# Patient Record
Sex: MALE | Race: WHITE | NOT HISPANIC OR LATINO | URBAN - METROPOLITAN AREA
[De-identification: names, ages, dates, MRNs, and addresses within clinical notes are randomized per-mention and may not be internally consistent; named-entity substitution may affect disease eponyms.]

---

## 2017-07-12 ENCOUNTER — OUTPATIENT (OUTPATIENT)
Dept: OUTPATIENT SERVICES | Facility: HOSPITAL | Age: 67
LOS: 1 days | End: 2017-07-12
Payer: MEDICARE

## 2017-07-12 VITALS
WEIGHT: 175.05 LBS | TEMPERATURE: 98 F | SYSTOLIC BLOOD PRESSURE: 163 MMHG | DIASTOLIC BLOOD PRESSURE: 90 MMHG | OXYGEN SATURATION: 99 % | RESPIRATION RATE: 20 BRPM | HEIGHT: 66 IN | HEART RATE: 57 BPM

## 2017-07-12 DIAGNOSIS — G56.01 CARPAL TUNNEL SYNDROME, RIGHT UPPER LIMB: ICD-10-CM

## 2017-07-12 DIAGNOSIS — Z98.49 CATARACT EXTRACTION STATUS, UNSPECIFIED EYE: Chronic | ICD-10-CM

## 2017-07-12 DIAGNOSIS — Z01.818 ENCOUNTER FOR OTHER PREPROCEDURAL EXAMINATION: ICD-10-CM

## 2017-07-12 DIAGNOSIS — Z98.890 OTHER SPECIFIED POSTPROCEDURAL STATES: Chronic | ICD-10-CM

## 2017-07-12 DIAGNOSIS — G56.00 CARPAL TUNNEL SYNDROME, UNSPECIFIED UPPER LIMB: ICD-10-CM

## 2017-07-12 LAB
ANION GAP SERPL CALC-SCNC: 12 MMOL/L — SIGNIFICANT CHANGE UP (ref 5–17)
BUN SERPL-MCNC: 23 MG/DL — SIGNIFICANT CHANGE UP (ref 7–23)
CALCIUM SERPL-MCNC: 9.8 MG/DL — SIGNIFICANT CHANGE UP (ref 8.4–10.5)
CHLORIDE SERPL-SCNC: 102 MMOL/L — SIGNIFICANT CHANGE UP (ref 96–108)
CO2 SERPL-SCNC: 25 MMOL/L — SIGNIFICANT CHANGE UP (ref 22–31)
CREAT SERPL-MCNC: 1.14 MG/DL — SIGNIFICANT CHANGE UP (ref 0.5–1.3)
GLUCOSE SERPL-MCNC: 96 MG/DL — SIGNIFICANT CHANGE UP (ref 70–99)
HCT VFR BLD CALC: 42.4 % — SIGNIFICANT CHANGE UP (ref 39–50)
HGB BLD-MCNC: 14.2 G/DL — SIGNIFICANT CHANGE UP (ref 13–17)
MCHC RBC-ENTMCNC: 28.9 PG — SIGNIFICANT CHANGE UP (ref 27–34)
MCHC RBC-ENTMCNC: 33.5 GM/DL — SIGNIFICANT CHANGE UP (ref 32–36)
MCV RBC AUTO: 86.4 FL — SIGNIFICANT CHANGE UP (ref 80–100)
PLATELET # BLD AUTO: 253 K/UL — SIGNIFICANT CHANGE UP (ref 150–400)
POTASSIUM SERPL-MCNC: 4.7 MMOL/L — SIGNIFICANT CHANGE UP (ref 3.5–5.3)
POTASSIUM SERPL-SCNC: 4.7 MMOL/L — SIGNIFICANT CHANGE UP (ref 3.5–5.3)
RBC # BLD: 4.91 M/UL — SIGNIFICANT CHANGE UP (ref 4.2–5.8)
RBC # FLD: 14 % — SIGNIFICANT CHANGE UP (ref 10.3–14.5)
SODIUM SERPL-SCNC: 139 MMOL/L — SIGNIFICANT CHANGE UP (ref 135–145)
WBC # BLD: 7.49 K/UL — SIGNIFICANT CHANGE UP (ref 3.8–10.5)
WBC # FLD AUTO: 7.49 K/UL — SIGNIFICANT CHANGE UP (ref 3.8–10.5)

## 2017-07-12 PROCEDURE — 80048 BASIC METABOLIC PNL TOTAL CA: CPT

## 2017-07-12 PROCEDURE — 85027 COMPLETE CBC AUTOMATED: CPT

## 2017-07-12 PROCEDURE — G0463: CPT

## 2017-07-12 RX ORDER — LIDOCAINE HCL 20 MG/ML
0.2 VIAL (ML) INJECTION ONCE
Qty: 0 | Refills: 0 | Status: DISCONTINUED | OUTPATIENT
Start: 2017-07-26 | End: 2017-08-10

## 2017-07-12 RX ORDER — SODIUM CHLORIDE 9 MG/ML
3 INJECTION INTRAMUSCULAR; INTRAVENOUS; SUBCUTANEOUS EVERY 8 HOURS
Qty: 0 | Refills: 0 | Status: DISCONTINUED | OUTPATIENT
Start: 2017-07-26 | End: 2017-08-10

## 2017-07-12 RX ORDER — ACETAMINOPHEN 500 MG
975 TABLET ORAL ONCE
Qty: 0 | Refills: 0 | Status: COMPLETED | OUTPATIENT
Start: 2017-07-26 | End: 2017-07-26

## 2017-07-12 RX ORDER — CELECOXIB 200 MG/1
200 CAPSULE ORAL ONCE
Qty: 0 | Refills: 0 | Status: COMPLETED | OUTPATIENT
Start: 2017-07-26 | End: 2017-07-26

## 2017-07-12 NOTE — H&P PST ADULT - NSANTHOSAYNRD_GEN_A_CORE
No. CARLO screening performed.  STOP BANG Legend: 0-2 = LOW Risk; 3-4 = INTERMEDIATE Risk; 5-8 = HIGH Risk

## 2017-07-12 NOTE — H&P PST ADULT - ATTENDING COMMENTS
The patient is admitted today for a right CTR and flexor tenosynovectomy and in situ decompression of ulnar nerve at elbow.  I have reviewed the procedure in detail again today with the patient.  The numerous risks, benefits, alternatives, possible complications and expectations are reviewed.  All questions have been thoroughly answered and the patient has verbalized understanding of all of the above and gives consent to proceed

## 2017-07-12 NOTE — H&P PST ADULT - HISTORY OF PRESENT ILLNESS
68 yo male with numbness in right wrist seen by MD, diagnosed with bilateral carpal tunnel and nerve compression right elbow. Here for surgery.

## 2017-07-12 NOTE — H&P PST ADULT - PROBLEM SELECTOR PLAN 1
right carpal tunnel release, flexor tenosynovectomy and in situ decompression of ulnar nerve at elbow  continue aspirin if ok w Dr Rendon

## 2017-07-26 ENCOUNTER — OUTPATIENT (OUTPATIENT)
Dept: OUTPATIENT SERVICES | Facility: HOSPITAL | Age: 67
LOS: 1 days | End: 2017-07-26
Payer: MEDICARE

## 2017-07-26 ENCOUNTER — TRANSCRIPTION ENCOUNTER (OUTPATIENT)
Age: 67
End: 2017-07-26

## 2017-07-26 ENCOUNTER — RESULT REVIEW (OUTPATIENT)
Age: 67
End: 2017-07-26

## 2017-07-26 VITALS
TEMPERATURE: 97 F | SYSTOLIC BLOOD PRESSURE: 123 MMHG | HEART RATE: 609 BPM | OXYGEN SATURATION: 100 % | DIASTOLIC BLOOD PRESSURE: 80 MMHG | RESPIRATION RATE: 14 BRPM

## 2017-07-26 VITALS
HEART RATE: 57 BPM | RESPIRATION RATE: 20 BRPM | OXYGEN SATURATION: 99 % | DIASTOLIC BLOOD PRESSURE: 90 MMHG | HEIGHT: 66 IN | TEMPERATURE: 98 F | WEIGHT: 175.05 LBS | SYSTOLIC BLOOD PRESSURE: 174 MMHG

## 2017-07-26 DIAGNOSIS — Z98.890 OTHER SPECIFIED POSTPROCEDURAL STATES: Chronic | ICD-10-CM

## 2017-07-26 DIAGNOSIS — Z01.818 ENCOUNTER FOR OTHER PREPROCEDURAL EXAMINATION: ICD-10-CM

## 2017-07-26 DIAGNOSIS — G56.01 CARPAL TUNNEL SYNDROME, RIGHT UPPER LIMB: ICD-10-CM

## 2017-07-26 DIAGNOSIS — Z98.49 CATARACT EXTRACTION STATUS, UNSPECIFIED EYE: Chronic | ICD-10-CM

## 2017-07-26 PROCEDURE — 64718 REVISE ULNAR NERVE AT ELBOW: CPT | Mod: RT

## 2017-07-26 PROCEDURE — 25115 REMOVE WRIST/FOREARM LESION: CPT | Mod: RT

## 2017-07-26 PROCEDURE — 88304 TISSUE EXAM BY PATHOLOGIST: CPT | Mod: 26

## 2017-07-26 RX ORDER — ASPIRIN/CALCIUM CARB/MAGNESIUM 324 MG
1 TABLET ORAL
Qty: 0 | Refills: 0 | COMMUNITY

## 2017-07-26 RX ORDER — ESZOPICLONE 2 MG/1
1 TABLET, COATED ORAL
Qty: 0 | Refills: 0 | COMMUNITY

## 2017-07-26 RX ORDER — SIMVASTATIN 20 MG/1
1 TABLET, FILM COATED ORAL
Qty: 0 | Refills: 0 | COMMUNITY

## 2017-07-26 RX ORDER — SODIUM CHLORIDE 9 MG/ML
1000 INJECTION, SOLUTION INTRAVENOUS
Qty: 0 | Refills: 0 | Status: DISCONTINUED | OUTPATIENT
Start: 2017-07-26 | End: 2017-08-10

## 2017-07-26 RX ORDER — METOPROLOL TARTRATE 50 MG
1 TABLET ORAL
Qty: 0 | Refills: 0 | COMMUNITY

## 2017-07-26 RX ORDER — AMLODIPINE BESYLATE 2.5 MG/1
1 TABLET ORAL
Qty: 0 | Refills: 0 | COMMUNITY

## 2017-07-26 RX ORDER — OXYCODONE HYDROCHLORIDE 5 MG/1
5 TABLET ORAL ONCE
Qty: 0 | Refills: 0 | Status: DISCONTINUED | OUTPATIENT
Start: 2017-07-26 | End: 2017-07-26

## 2017-07-26 RX ORDER — ONDANSETRON 8 MG/1
4 TABLET, FILM COATED ORAL ONCE
Qty: 0 | Refills: 0 | Status: DISCONTINUED | OUTPATIENT
Start: 2017-07-26 | End: 2017-08-10

## 2017-07-26 RX ORDER — FAMOTIDINE 10 MG/ML
0 INJECTION INTRAVENOUS
Qty: 0 | Refills: 0 | COMMUNITY

## 2017-07-26 RX ORDER — CELECOXIB 200 MG/1
200 CAPSULE ORAL ONCE
Qty: 0 | Refills: 0 | Status: DISCONTINUED | OUTPATIENT
Start: 2017-07-26 | End: 2017-08-10

## 2017-07-26 RX ADMIN — Medication 975 MILLIGRAM(S): at 12:59

## 2017-07-26 RX ADMIN — CELECOXIB 200 MILLIGRAM(S): 200 CAPSULE ORAL at 12:59

## 2017-07-26 NOTE — PRE-ANESTHESIA EVALUATION ADULT - NSPROPOSEDPROCEDFT_GEN_ALL_CORE
right carpal tunnel release and flexor tenosynovecotmy, in situ decompression of right ulnar nerve at elbow

## 2017-07-26 NOTE — ASU DISCHARGE PLAN (ADULT/PEDIATRIC). - ITEMS TO FOLLOWUP WITH YOUR PHYSICIAN'S
Please follow up with Dr. Rendon within 1-2 weeks after discharge from the hospital. You may call [phone] to schedule an appointment.

## 2017-07-26 NOTE — BRIEF OPERATIVE NOTE - PRE-OP DX
Carpal tunnel syndrome of right wrist  07/26/2017    Active  Aravind Reyes  Cubital tunnel syndrome on right  07/26/2017    Active  Aravind Reyes

## 2017-07-26 NOTE — ASU DISCHARGE PLAN (ADULT/PEDIATRIC). - NOTIFY
Fever greater than 101/Persistent Nausea and Vomiting/Excessive Diarrhea/Inability to Tolerate Liquids or Foods/Numbness, color, or temperature change to extremity/Bleeding that does not stop

## 2017-07-26 NOTE — BRIEF OPERATIVE NOTE - PROCEDURE
Cubital tunnel release  07/26/2017    Active  [id number]  Carpal tunnel release, right  07/26/2017    Active  [id number]

## 2017-07-31 LAB — SURGICAL PATHOLOGY STUDY: SIGNIFICANT CHANGE UP

## 2019-02-14 PROBLEM — I10 ESSENTIAL (PRIMARY) HYPERTENSION: Chronic | Status: ACTIVE | Noted: 2017-07-12

## 2019-02-14 PROBLEM — E78.5 HYPERLIPIDEMIA, UNSPECIFIED: Chronic | Status: ACTIVE | Noted: 2017-07-12

## 2019-02-18 ENCOUNTER — OUTPATIENT (OUTPATIENT)
Dept: OUTPATIENT SERVICES | Facility: HOSPITAL | Age: 69
LOS: 1 days | End: 2019-02-18
Payer: MEDICARE

## 2019-02-18 ENCOUNTER — APPOINTMENT (OUTPATIENT)
Dept: MRI IMAGING | Facility: CLINIC | Age: 69
End: 2019-02-18
Payer: MEDICARE

## 2019-02-18 DIAGNOSIS — Z98.49 CATARACT EXTRACTION STATUS, UNSPECIFIED EYE: Chronic | ICD-10-CM

## 2019-02-18 DIAGNOSIS — Z00.8 ENCOUNTER FOR OTHER GENERAL EXAMINATION: ICD-10-CM

## 2019-02-18 DIAGNOSIS — Z98.890 OTHER SPECIFIED POSTPROCEDURAL STATES: Chronic | ICD-10-CM

## 2019-02-18 PROCEDURE — 72197 MRI PELVIS W/O & W/DYE: CPT

## 2019-02-18 PROCEDURE — A9585: CPT

## 2019-02-18 PROCEDURE — 72197 MRI PELVIS W/O & W/DYE: CPT | Mod: 26

## 2019-05-03 ENCOUNTER — APPOINTMENT (OUTPATIENT)
Dept: MRI IMAGING | Facility: CLINIC | Age: 69
End: 2019-05-03
Payer: MEDICARE

## 2019-05-03 ENCOUNTER — OUTPATIENT (OUTPATIENT)
Dept: OUTPATIENT SERVICES | Facility: HOSPITAL | Age: 69
LOS: 1 days | End: 2019-05-03
Payer: MEDICARE

## 2019-05-03 DIAGNOSIS — Z98.49 CATARACT EXTRACTION STATUS, UNSPECIFIED EYE: Chronic | ICD-10-CM

## 2019-05-03 DIAGNOSIS — Z98.890 OTHER SPECIFIED POSTPROCEDURAL STATES: Chronic | ICD-10-CM

## 2019-05-03 DIAGNOSIS — Z00.8 ENCOUNTER FOR OTHER GENERAL EXAMINATION: ICD-10-CM

## 2019-05-03 PROCEDURE — 73718 MRI LOWER EXTREMITY W/O DYE: CPT

## 2019-05-03 PROCEDURE — 73718 MRI LOWER EXTREMITY W/O DYE: CPT | Mod: 26,RT

## 2020-05-06 NOTE — PACU DISCHARGE NOTE - MENTAL STATUS: PATIENT PARTICIPATION
Awake Spironolactone Pregnancy And Lactation Text: This medication can cause feminization of the male fetus and should be avoided during pregnancy. The active metabolite is also found in breast milk.

## 2021-01-04 ENCOUNTER — TRANSCRIPTION ENCOUNTER (OUTPATIENT)
Age: 71
End: 2021-01-04

## 2021-01-04 ENCOUNTER — EMERGENCY (EMERGENCY)
Facility: HOSPITAL | Age: 71
LOS: 1 days | Discharge: LEFT BEFORE DISCHARGE | End: 2021-01-04
Attending: STUDENT IN AN ORGANIZED HEALTH CARE EDUCATION/TRAINING PROGRAM
Payer: MEDICARE

## 2021-01-04 VITALS
DIASTOLIC BLOOD PRESSURE: 76 MMHG | HEIGHT: 66 IN | OXYGEN SATURATION: 90 % | HEART RATE: 90 BPM | WEIGHT: 179.9 LBS | RESPIRATION RATE: 20 BRPM | SYSTOLIC BLOOD PRESSURE: 119 MMHG | TEMPERATURE: 100 F

## 2021-01-04 VITALS
OXYGEN SATURATION: 94 % | TEMPERATURE: 99 F | DIASTOLIC BLOOD PRESSURE: 89 MMHG | SYSTOLIC BLOOD PRESSURE: 127 MMHG | RESPIRATION RATE: 19 BRPM | HEART RATE: 88 BPM

## 2021-01-04 DIAGNOSIS — Z98.890 OTHER SPECIFIED POSTPROCEDURAL STATES: Chronic | ICD-10-CM

## 2021-01-04 DIAGNOSIS — Z98.49 CATARACT EXTRACTION STATUS, UNSPECIFIED EYE: Chronic | ICD-10-CM

## 2021-01-04 LAB
ALBUMIN SERPL ELPH-MCNC: 3.4 G/DL — SIGNIFICANT CHANGE UP (ref 3.3–5)
ALP SERPL-CCNC: 106 U/L — SIGNIFICANT CHANGE UP (ref 40–120)
ALT FLD-CCNC: 22 U/L — SIGNIFICANT CHANGE UP (ref 10–45)
ANION GAP SERPL CALC-SCNC: 16 MMOL/L — SIGNIFICANT CHANGE UP (ref 5–17)
AST SERPL-CCNC: 48 U/L — HIGH (ref 10–40)
BASOPHILS # BLD AUTO: 0.01 K/UL — SIGNIFICANT CHANGE UP (ref 0–0.2)
BASOPHILS NFR BLD AUTO: 0.1 % — SIGNIFICANT CHANGE UP (ref 0–2)
BILIRUB SERPL-MCNC: 0.4 MG/DL — SIGNIFICANT CHANGE UP (ref 0.2–1.2)
BUN SERPL-MCNC: 24 MG/DL — HIGH (ref 7–23)
CALCIUM SERPL-MCNC: 9 MG/DL — SIGNIFICANT CHANGE UP (ref 8.4–10.5)
CHLORIDE SERPL-SCNC: 95 MMOL/L — LOW (ref 96–108)
CO2 SERPL-SCNC: 19 MMOL/L — LOW (ref 22–31)
CREAT SERPL-MCNC: 1.35 MG/DL — HIGH (ref 0.5–1.3)
CRP SERPL-MCNC: 17.62 MG/DL — HIGH (ref 0–0.4)
D DIMER BLD IA.RAPID-MCNC: 476 NG/ML DDU — HIGH
EOSINOPHIL # BLD AUTO: 0 K/UL — SIGNIFICANT CHANGE UP (ref 0–0.5)
EOSINOPHIL NFR BLD AUTO: 0 % — SIGNIFICANT CHANGE UP (ref 0–6)
GLUCOSE SERPL-MCNC: 108 MG/DL — HIGH (ref 70–99)
HCT VFR BLD CALC: 47.3 % — SIGNIFICANT CHANGE UP (ref 39–50)
HGB BLD-MCNC: 15.4 G/DL — SIGNIFICANT CHANGE UP (ref 13–17)
IMM GRANULOCYTES NFR BLD AUTO: 2 % — HIGH (ref 0–1.5)
LYMPHOCYTES # BLD AUTO: 0.77 K/UL — LOW (ref 1–3.3)
LYMPHOCYTES # BLD AUTO: 4.9 % — LOW (ref 13–44)
MCHC RBC-ENTMCNC: 28 PG — SIGNIFICANT CHANGE UP (ref 27–34)
MCHC RBC-ENTMCNC: 32.6 GM/DL — SIGNIFICANT CHANGE UP (ref 32–36)
MCV RBC AUTO: 86 FL — SIGNIFICANT CHANGE UP (ref 80–100)
MONOCYTES # BLD AUTO: 0.73 K/UL — SIGNIFICANT CHANGE UP (ref 0–0.9)
MONOCYTES NFR BLD AUTO: 4.7 % — SIGNIFICANT CHANGE UP (ref 2–14)
NEUTROPHILS # BLD AUTO: 13.79 K/UL — HIGH (ref 1.8–7.4)
NEUTROPHILS NFR BLD AUTO: 88.3 % — HIGH (ref 43–77)
NRBC # BLD: 0 /100 WBCS — SIGNIFICANT CHANGE UP (ref 0–0)
PLATELET # BLD AUTO: 233 K/UL — SIGNIFICANT CHANGE UP (ref 150–400)
POTASSIUM SERPL-MCNC: 4.3 MMOL/L — SIGNIFICANT CHANGE UP (ref 3.5–5.3)
POTASSIUM SERPL-SCNC: 4.3 MMOL/L — SIGNIFICANT CHANGE UP (ref 3.5–5.3)
PROCALCITONIN SERPL-MCNC: 0.28 NG/ML — HIGH (ref 0.02–0.1)
PROT SERPL-MCNC: 7.5 G/DL — SIGNIFICANT CHANGE UP (ref 6–8.3)
RBC # BLD: 5.5 M/UL — SIGNIFICANT CHANGE UP (ref 4.2–5.8)
RBC # FLD: 14.8 % — HIGH (ref 10.3–14.5)
SARS-COV-2 RNA SPEC QL NAA+PROBE: DETECTED
SODIUM SERPL-SCNC: 130 MMOL/L — LOW (ref 135–145)
WBC # BLD: 15.61 K/UL — HIGH (ref 3.8–10.5)
WBC # FLD AUTO: 15.61 K/UL — HIGH (ref 3.8–10.5)

## 2021-01-04 PROCEDURE — U0005: CPT

## 2021-01-04 PROCEDURE — 85379 FIBRIN DEGRADATION QUANT: CPT

## 2021-01-04 PROCEDURE — 93005 ELECTROCARDIOGRAM TRACING: CPT

## 2021-01-04 PROCEDURE — 84145 PROCALCITONIN (PCT): CPT

## 2021-01-04 PROCEDURE — 99284 EMERGENCY DEPT VISIT MOD MDM: CPT | Mod: CS

## 2021-01-04 PROCEDURE — 85025 COMPLETE CBC W/AUTO DIFF WBC: CPT

## 2021-01-04 PROCEDURE — 80053 COMPREHEN METABOLIC PANEL: CPT

## 2021-01-04 PROCEDURE — 99283 EMERGENCY DEPT VISIT LOW MDM: CPT

## 2021-01-04 PROCEDURE — 86140 C-REACTIVE PROTEIN: CPT

## 2021-01-04 PROCEDURE — 87635 SARS-COV-2 COVID-19 AMP PRB: CPT

## 2021-01-04 PROCEDURE — 82728 ASSAY OF FERRITIN: CPT

## 2021-01-04 NOTE — ED PROVIDER NOTE - PATIENT PORTAL LINK FT
You can access the FollowMyHealth Patient Portal offered by [facility] by registering at the following website: [email or web address]. By joining [employer]’s FollowMyHealth portal, you will also be able to view your health information using other applications (apps) compatible with our system.

## 2021-01-04 NOTE — ED PROVIDER NOTE - PROGRESS NOTE DETAILS
daughter who is  for health system wants patient to go home and reports will set up all outpatient services. patient is ok with plan wants to go home. Abdullahi Alvarez PA-C Dr. Shantal Garcia: Plan was to admit patient for hypoxia, however patient does not wishes to stay and wants to leave the hospital now. The patient understands the risk of leaving without further evaluation and workup.  The patient however still declines and will follow up with their primary physician.

## 2021-01-04 NOTE — ED PROVIDER NOTE - CLINICAL SUMMARY MEDICAL DECISION MAKING FREE TEXT BOX
Dr. Shantal Garcia: 70 year old with pmhx of HTN, HLD presented to ED with 10days of COVID like symptoms. Pt with known COIVD contact, wife also has COVID like symptoms. On exam: Pt well appearing, able to speaking in full sentences. Desaturates to 91% when ambulates. Plan: Labs, cxr. admit

## 2021-01-04 NOTE — ED PROVIDER NOTE - NSFOLLOWUPINSTRUCTIONS_ED_ALL_ED_FT
1. You were seen in the ED and underwent testing for the novel coronarvirus (COVID-19). The results are not back yet and you will be contacted with the result which can take up to 7 days. You were also tested for other common viruses such as the flu and cold viruses. You will be notified if you test positive for any of these.    2. Until your test results are back, YOU MUST SELF-QUARANTINE until you are told to other otherwise by [facility] or the local [facility] department. This is extremely important to limit the spread of this virus. Please refer closely to the packet provided to you on the specifics of the process of self-quarantine.    3. If you end up testing positive for the virus, you will instructed as to when you can return to your usual activities. If you do not hear from anyone in 7 days, please call [phone] or your local health department for guidance.     4. Return to the ED for difficulty breathing.    5. You may over the counter acetaminophen (Tylenol) 650mg every 6 hours as needed for fever or pain. There is some concern in the medical community about using ibuprofen (Advil, Motrin) and other NSAIDs in people with COVID infections and until there is more research on this subject it may be best to avoid NSAIDs like ibuprofen at the moment unless you have an allergy to acetaminophen (Tylenol).  Do NOT exceed 3500mg acetaminophen in 24 hours.  Please do not take these medications if you do not have pain or fever or if you have any history of liver disease.     -------------    What is a coronavirus?  Coronaviruses are a large family of viruses that cause illnesses ranging from the common cold to more severe diseases such as Middle East Respiratory Syndrome (MERS) and Severe Acute Respiratory Syndrome (SARS).    What is Novel Coronavirus (COVID-19)?  The Centers for Disease Control and Prevention (CDC) is closely monitoring the outbreak caused by COVID-19. For the latest information about COVID-19, visit the CDC website at CDC.gov/Coronavirus    How are coronaviruses spread?  Coronaviruses can be transmitted from person to person, usually after close contact with an infected  person (for example, in a household, workplace, or healthcare setting), via droplets that become airborne after a cough or sneeze. These droplets can then infect a nearby person. Transmission can also occur by touching recently contaminated surfaces.    Is there a treatment for a COVID-19?  There is no specific treatment for disease caused by COVID-19. However, many of the symptoms can be treated based on the patient’s clinical condition. Supportive care for infected persons can be highly effective.    What are the symptoms of coronavirus infection?  It depends on the virus, but common signs include fever and/or respiratory symptoms such as cough and shortness of breath. In more severe cases, infection can cause pneumonia, severe acute respiratory syndrome, kidney failure and even death. Fortunately, most cases of COVID-19 have an illness no different than the influenza (flu), with a majority of these patients having mild symptoms and overall mortality which appears to be not much different than the flu.    What can I do to protect myself?  The best precautionary measures:  – washing your hands  – covering your cough  – disinfecting surfaces  – it is also advisable to avoid close contact with anyone showing symptoms of respiratory illness such as coughing and sneezing  – those with symptoms should wear a surgical mask when around others    What can I do to protect those around me?  If you have been identified as someone who may be infected with COVID-19, we recommend you follow the self-isolation procedures outlined on the following page to protect those around you and to limit the spread of this virus.    We recommend the below precautionary steps from now until 14 days from when you returned from your travel or date of your last known possible contact:    — Do not go to work, school or public areas. Avoid using public transportation, ridesharing or taxis.  — As much as possible, separate yourself from other people in your home. If you can, you should stay in a room and away from other people. Also, you should use a separate bathroom if available.  — Wear the supplied mask whenever you are around other people.  — If you have a non-urgent medical appointment, please reschedule for a later date. If the appointment is urgent, please call the health care provider and tell them that you are on self-isolation for possible COVID-19. This will help the health care provider’s office take steps to keep other people from getting infected or exposed. If you can reschedule routine appointments, do so.  — Wash your hands often with soap and water for at least 15 to 20 seconds or clean your hands with an alcohol-based hand  that contains 60 to 95% alcohol, covering all surfaces of your hands and rubbing them together until they feel dry. Soap and water should be used preferentially if hands are visibly dirty.  — Cover your mouth and nose with a tissue when you cough or sneeze. Throw used tissues in a lined trash can. Immediately wash your hands.  — Avoid touching your eyes, nose, and mouth with your hands.  — Avoid sharing personal household items. You should not share dishes, drinking glasses, cups, eating utensils, towels, or bedding with other people or pets in your home. After using these items, they should be washed thoroughly with soap and water.  — Clean and disinfect all “high-touch” surfaces every day. High touch surfaces include counters, tabletops, doorknobs, light switches, remote controls, bathroom fixtures, toilets, phones, keyboards, tablets, and bedside tables. Also, clean any surfaces that may have blood, stool, or body fluids on them.    ------------------------------------------  Information for patients who have received a COVID-19 test.    The COVID-19 (novel coronavirus) test  Results may take up to 7 days to become available.      If your result is positive, you will receive a phone call from one of our coronavirus specialists. While we will do our best to also call patients with a negative test result, the sheer volume of tests being performed may make this difficult to do in a timely fashion. If you haven’t heard from us within 5 days and you’d like to check on your results, you can check our Kien[facility] bonnie or call one of our coronavirus specialists at [address] [place] [address] (available 24/7)    Please DO NOT call the site where you received the test to obtain your results.    If the test is positive -   You will continue home isolation until you are completely well, you have no fever, and it has been at least 14 days since your positive test. The health department in your city or county may also contact you with additional instructions.    If your test is negative -    You will be able to stop home isolation and resume standard precautions, similar to how you would manage the common cold or flu.  If you have any questions, you can reach out to one of our coronavirus specialists at [address] [place] [address].    REMEMBER - a negative COVID test means you were negative AT THE TIME OF TESTING, and it is possible to have contracted COVID after being tested.     Please call [phone] to schedule an appointment for monoclonal antibody infusion treatment.

## 2021-01-04 NOTE — ED PROVIDER NOTE - PHYSICAL EXAMINATION
On Physical Exam:  General: well appearing, in NAD, speaking clearly in full sentences and without difficulty; cooperative with exam  HEENT: PERRL, MMM  Neck: no neck tenderness, no nuchal rigidity  Cardiac: normal s1, s2; RRR; no MGR  Lungs: CTABL  Abdomen: soft nontender/nondistended  : no bladder tenderness or distension  Skin: warm, intact, no rash  Extremities: no peripheral edema, no gross deformities  Neuro: no gross neurologic deficits

## 2021-01-04 NOTE — ED ADULT NURSE NOTE - OBJECTIVE STATEMENT
71yo M, states he is known covid positive, has been monitoring his symptoms at home including oxygen saturation and states it was in the mid 80s, along with persistent cough and headache, brought self to ED. arrives on NC, sat high 90s. pt denies SOB now or at home. c/o frontal headache with cough still. lung sounds clear to auscultation. denies chest pain, palpitations or any new onset of lower extremity edema, none noted on assessment.  pt presents alert, sitting upright in stretcher with legs crossed. relaxed body posture, good skin color. respirations not labored or shallow. iso precautions in place 2/2 covid. signage placed on door. call bell is in reach , pt is aaox4, able to make needs known.

## 2021-01-04 NOTE — ED ADULT NURSE NOTE - NSIMPLEMENTINTERV_GEN_ALL_ED
Implemented All Universal Safety Interventions:  [place] to call system. Call bell, personal items and telephone within reach. Instruct patient to call for assistance. Room bathroom lighting operational. Non-slip footwear when patient is off stretcher. Physically safe environment: no spills, clutter or unnecessary equipment. Stretcher in lowest position, wheels locked, appropriate side rails in place.

## 2021-01-04 NOTE — ED PROVIDER NOTE - OBJECTIVE STATEMENT
70 year old with pmhx of HTN, HLD, presents to the ED with 10 days of COVID like symptoms. patient reports feeling weak and cough 10 days ago and reports worsening symptoms today. patient reports home o2 sat of 88-92% ORA. patient denies any CP, SOB, exertional dyspnea, reports feeling well. daughter who is  at bedside wants patient to go home with CROWN covid FU. patient denies chest pain, Shortness of Breath, abdominal pain, Nausea/Vomiting/Diarrhea, dizziness, weakness, confusion, vision changes, urinary symptoms, syncope, falls, trauma, discharge, bleeding. patient reports currently taking abx and steroids x3 days since prescribed by PCP. patient reports fever relieved with Tylenol.

## 2021-01-04 NOTE — ED PROVIDER NOTE - CHILD ABUSE FACILITY
I have seen and examined the patient. Discussed with resident and agree with the findings and plan as documented. Counseled the patient in regards to next steps.     [facility]

## 2021-01-05 ENCOUNTER — APPOINTMENT (OUTPATIENT)
Dept: PULMONOLOGY | Facility: CLINIC | Age: 71
End: 2021-01-05
Payer: MEDICARE

## 2021-01-05 DIAGNOSIS — U07.1 COVID-19: ICD-10-CM

## 2021-01-05 LAB — FERRITIN SERPL-MCNC: 621 NG/ML — HIGH (ref 30–400)

## 2021-01-05 PROCEDURE — 99205 OFFICE O/P NEW HI 60 MIN: CPT | Mod: CS,95

## 2021-01-06 ENCOUNTER — APPOINTMENT (OUTPATIENT)
Dept: PULMONOLOGY | Facility: CLINIC | Age: 71
End: 2021-01-06

## 2021-01-06 NOTE — ED POST DISCHARGE NOTE - RESULT SUMMARY
Pt was seen in [facility] ED and dishcarged with COVID. elevated ferritin level clinically correlates with COVID dx. per inpatient notes, team aware of level. appropriate care being given. no need for callback at this time - Russell

## 2021-01-06 NOTE — HISTORY OF PRESENT ILLNESS
[Home] : at home, [unfilled] , at the time of the visit. [Medical Office: ([facility])___] : at the medical office located in  [Spouse] : spouse [Verbal consent obtained from patient] : the patient, [unfilled] [FreeTextEntry1] : video f/u COVID -- Wednesday January 6th\par \par Feeling a bit better this morning\par \par on the 3lpm of the machine he already had, O2 saturations this morning 92%.\par \par Unclear happened with the oxygen last night.  He received a phone call at 5:30 PM that it would be delivered, they say that it was not.  They called the company who reported it was delivered at 11:30 PM, but they do not see it outside.  We are looking into this.  Fortunately he already has some form of oxygen\par \par Started the dexamethasone and aspirin last night.\par \par Febrile today.  Overall feeling better.  Taking p.o.  Does still have drenching night sweats, as we see commonly.\par \par On video examination, he appears in no distress.  Speaking clearly and in full sentences.  Saturating 92% on the pulsed flow 3 L\par \par Awaiting labs to be done today.\par \par We will follow closely.  Again reiterated if cannot maintain sat above 90% with 3 L/min, to let us know when he will need to go to the hospital\par

## 2021-01-06 NOTE — HISTORY OF PRESENT ILLNESS
[Home] : at home, [unfilled] , at the time of the visit. [Medical Office: ([facility])___] : at the medical office located in  [Spouse] : spouse [Verbal consent obtained from patient] : the patient, [unfilled] [FreeTextEntry1] : video f/u COVID -- Wednesday January 6th\par \par Feeling a bit better this morning\par \par on the 3lpm of the machine he already had, O2 saturations this morning 92%.\par \par Unclear happened with the oxygen last night.  He received a phone call at 5:30 PM that it would be delivered, they say that it was not.  They called the company who reported it was delivered at 11:30 PM, but they do not see it outside.  We are looking into this.  Fortunately he already has some form of oxygen\par \par Started the dexamethasone and aspirin last night.\par \par Febrile today.  Overall feeling better.  Taking p.o.  Does still have drenching night sweats, as we see commonly.\par \par On video examination, he appears in no distress.  Speaking clearly and in full sentences.  Saturating 92% on the pulsed flow 3 L\par \par Awaiting labs to be done today.\par \par We will follow closely.  Again reiterated if cannot maintain sat above 90% with 3 L/min, to let us know when he will need to go to the hospital\par

## 2021-01-07 ENCOUNTER — APPOINTMENT (OUTPATIENT)
Dept: INTERNAL MEDICINE | Facility: CLINIC | Age: 71
End: 2021-01-07

## 2021-01-07 ENCOUNTER — APPOINTMENT (OUTPATIENT)
Dept: PULMONOLOGY | Facility: CLINIC | Age: 71
End: 2021-01-07
Payer: MEDICARE

## 2021-01-07 LAB
ALBUMIN SERPL ELPH-MCNC: 3.5 G/DL
ALP BLD-CCNC: 119 U/L
ALT SERPL-CCNC: 17 U/L
ANION GAP SERPL CALC-SCNC: 13 MMOL/L
AST SERPL-CCNC: 48 U/L
BASOPHILS # BLD AUTO: 0.02 K/UL
BASOPHILS NFR BLD AUTO: 0.2 %
BILIRUB SERPL-MCNC: 0.3 MG/DL
BUN SERPL-MCNC: 36 MG/DL
CALCIUM SERPL-MCNC: 9.3 MG/DL
CHLORIDE SERPL-SCNC: 97 MMOL/L
CO2 SERPL-SCNC: 24 MMOL/L
CREAT SERPL-MCNC: 1.6 MG/DL
CRP SERPL-MCNC: 7.97 MG/DL
DEPRECATED D DIMER PPP IA-ACNC: 411 NG/ML DDU
EOSINOPHIL # BLD AUTO: 0.06 K/UL
EOSINOPHIL NFR BLD AUTO: 0.5 %
FERRITIN SERPL-MCNC: 1088 NG/ML
GLUCOSE SERPL-MCNC: 135 MG/DL
HCT VFR BLD CALC: 48.2 %
HGB BLD-MCNC: 15.9 G/DL
IMM GRANULOCYTES NFR BLD AUTO: 1 %
LYMPHOCYTES # BLD AUTO: 1.11 K/UL
LYMPHOCYTES NFR BLD AUTO: 8.8 %
MAN DIFF?: NORMAL
MCHC RBC-ENTMCNC: 28.2 PG
MCHC RBC-ENTMCNC: 33 GM/DL
MCV RBC AUTO: 85.5 FL
MONOCYTES # BLD AUTO: 0.66 K/UL
MONOCYTES NFR BLD AUTO: 5.3 %
NEUTROPHILS # BLD AUTO: 10.58 K/UL
NEUTROPHILS NFR BLD AUTO: 84.2 %
PLATELET # BLD AUTO: 347 K/UL
POTASSIUM SERPL-SCNC: 4.9 MMOL/L
PROCALCITONIN SERPL-MCNC: 0.35 NG/ML
PROT SERPL-MCNC: 6.7 G/DL
RBC # BLD: 5.64 M/UL
RBC # FLD: 15 %
SODIUM SERPL-SCNC: 135 MMOL/L
WBC # FLD AUTO: 12.56 K/UL

## 2021-01-07 PROCEDURE — 99215 OFFICE O/P EST HI 40 MIN: CPT | Mod: CS,95

## 2021-01-07 RX ORDER — ASPIRIN 325 MG/1
325 TABLET, FILM COATED ORAL DAILY
Qty: 30 | Refills: 0 | Status: DISCONTINUED | COMMUNITY
Start: 2021-01-05 | End: 2021-01-07

## 2021-01-07 RX ORDER — RIVAROXABAN 10 MG/1
10 TABLET, FILM COATED ORAL
Qty: 30 | Refills: 1 | Status: ACTIVE | COMMUNITY
Start: 2021-01-07 | End: 1900-01-01

## 2021-01-07 NOTE — HISTORY OF PRESENT ILLNESS
[Home] : at home, [unfilled] , at the time of the visit. [Medical Office: ([facility])___] : at the medical office located in  [Verbal consent obtained from patient] : the patient, [unfilled] [FreeTextEntry1] : I have seen and spoken to pt using telehealth video and audio for f/u with Covid 19 infection.\par \par Pt reports\par - feeling better \par - on oxygen 3 l /min O2 sat 96 % at rest, walking - 92%- 89 %\par - no fevers,diarrhea,vomiting\par - drinking water 4 cups daily,eating\par \par Plan\par - lab results reviewed Ferritin 1088,Cr.1.6,CRP 7.97,d-dimer 411,procalcitonin 0.35\par - Pt is on Dexamethasone- continue\par - stop LAD355 mg , start Xarelto\par - increase fluids intake to 6-8 cups daily.\par - IV fluids if needed\par - walking as tolerated - pt to take break if O2 sat less then 90%.\par - f/u  tomorrow and repeat labs\par \par \par attending:\par As above.  Patient on dexamethasone, oxygen 3 L/min.  Okay at rest and with some exertion.  But does desaturate to 88% on further walking.  Sats come up quickly though.  Currently on really the border\par Labs reviewed.  Changing aspirin to Xarelto.\par Late in the course of remdesivir would not be effective\par He needs to hydrate further, he will increase his p.o. intake.  He has no nausea vomiting or diarrhea.  And no fevers.\par Repeat labs ordered for tomorrow.\par Again advised to make sure that if he cannot maintain his oxygen above 99 he needs to go to the emergency room.\par Follow-up tomorrow morning

## 2021-01-08 ENCOUNTER — APPOINTMENT (OUTPATIENT)
Dept: PULMONOLOGY | Facility: CLINIC | Age: 71
End: 2021-01-08
Payer: MEDICARE

## 2021-01-08 PROCEDURE — 99214 OFFICE O/P EST MOD 30 MIN: CPT | Mod: 95

## 2021-01-08 NOTE — HISTORY OF PRESENT ILLNESS
[Home] : at home, [unfilled] , at the time of the visit. [Medical Office: ([facility])___] : at the medical office located in  [Verbal consent obtained from patient] : the patient, [unfilled] [FreeTextEntry1] : I have spoken to pt using telehealth video and audio for f/u with Covid 19 infection.\par Pt reports\par -feeling the same.\par - no fevers\par -O2 sat 95-96% , RA 88 % then goes fast to 92% on oxygen\par - eating ,drinking,taste is coming come back\par - pt on Xarellto, Dexamethasone\par \par Plan\par continue a treatment \par fluids\par increase walking with oxygen\par monitor temp and O2sat\par f/u tomorrow\par \par Attending:\par as above\par on exam, pt comfortable, speaking clearly , no distress\par Feeling "the same," not worse.\par O2 sats go to 88 on room air - which is better than initial\par and 92-95% on the 02\par not sob\par afebrile. good appetite\par on dex and asa\par Repeat labs today\par will follow over the weekend

## 2021-01-09 ENCOUNTER — NON-APPOINTMENT (OUTPATIENT)
Age: 71
End: 2021-01-09

## 2021-01-10 ENCOUNTER — NON-APPOINTMENT (OUTPATIENT)
Age: 71
End: 2021-01-10

## 2021-01-11 ENCOUNTER — NON-APPOINTMENT (OUTPATIENT)
Age: 71
End: 2021-01-11

## 2021-01-11 ENCOUNTER — APPOINTMENT (OUTPATIENT)
Dept: PULMONOLOGY | Facility: CLINIC | Age: 71
End: 2021-01-11
Payer: MEDICARE

## 2021-01-11 LAB
ALBUMIN SERPL ELPH-MCNC: 3.4 G/DL
ALP BLD-CCNC: 115 U/L
ALT SERPL-CCNC: 32 U/L
ANION GAP SERPL CALC-SCNC: 12 MMOL/L
AST SERPL-CCNC: 55 U/L
BASOPHILS # BLD AUTO: 0.03 K/UL
BASOPHILS NFR BLD AUTO: 0.2 %
BILIRUB SERPL-MCNC: 0.3 MG/DL
BUN SERPL-MCNC: 34 MG/DL
CALCIUM SERPL-MCNC: 8.6 MG/DL
CHLORIDE SERPL-SCNC: 102 MMOL/L
CO2 SERPL-SCNC: 23 MMOL/L
CREAT SERPL-MCNC: 1.35 MG/DL
CRP SERPL-MCNC: 1.79 MG/DL
DEPRECATED D DIMER PPP IA-ACNC: 535 NG/ML DDU
EOSINOPHIL # BLD AUTO: 0 K/UL
EOSINOPHIL NFR BLD AUTO: 0 %
FERRITIN SERPL-MCNC: 849 NG/ML
GLUCOSE SERPL-MCNC: 128 MG/DL
HCT VFR BLD CALC: 47.2 %
HGB BLD-MCNC: 15.2 G/DL
IMM GRANULOCYTES NFR BLD AUTO: 1.6 %
LYMPHOCYTES # BLD AUTO: 0.59 K/UL
LYMPHOCYTES NFR BLD AUTO: 3.4 %
MAN DIFF?: NORMAL
MCHC RBC-ENTMCNC: 27.8 PG
MCHC RBC-ENTMCNC: 32.2 GM/DL
MCV RBC AUTO: 86.4 FL
MONOCYTES # BLD AUTO: 0.61 K/UL
MONOCYTES NFR BLD AUTO: 3.5 %
NEUTROPHILS # BLD AUTO: 16.08 K/UL
NEUTROPHILS NFR BLD AUTO: 91.3 %
PLATELET # BLD AUTO: 380 K/UL
POTASSIUM SERPL-SCNC: 5.2 MMOL/L
PROT SERPL-MCNC: 6.6 G/DL
RBC # BLD: 5.46 M/UL
RBC # FLD: 14.7 %
SODIUM SERPL-SCNC: 137 MMOL/L
WBC # FLD AUTO: 17.6 K/UL

## 2021-01-11 PROCEDURE — 99213 OFFICE O/P EST LOW 20 MIN: CPT | Mod: CS,95

## 2021-01-11 NOTE — HISTORY OF PRESENT ILLNESS
[Home] : at home, [unfilled] , at the time of the visit. [Medical Office: ([facility])___] : at the medical office located in  [Verbal consent obtained from patient] : the patient, [unfilled] [FreeTextEntry1] : COVID f/u , telehealth\par \par Overall slow steady improvement\par  \par On 3lpm, sats consist in mids 90s\par Walking more, going outside\par \par MIld nose bleed.\par \par appetite better\par \par On exam , looks comfortable , speaking clearly, no distress\par \par labs reviewed. WBC higher with steroids. Creat improved. crp, ferr coming down\par dimer 535\par \par cont steroids, proph xarelto\par 02 - wean to 2lplm\par will follow

## 2021-01-13 ENCOUNTER — NON-APPOINTMENT (OUTPATIENT)
Age: 71
End: 2021-01-13

## 2021-01-13 ENCOUNTER — APPOINTMENT (OUTPATIENT)
Dept: PULMONOLOGY | Facility: CLINIC | Age: 71
End: 2021-01-13
Payer: MEDICARE

## 2021-01-13 PROCEDURE — 99441: CPT | Mod: 95,CS

## 2021-01-13 NOTE — HISTORY OF PRESENT ILLNESS
[Home] : at home, [unfilled] , at the time of the visit. [Medical Office: ([facility])___] : at the medical office located in  [Verbal consent obtained from patient] : the patient, [unfilled]

## 2021-01-13 NOTE — HISTORY OF PRESENT ILLNESS
[Home] : at home, [unfilled] , at the time of the visit. [Medical Office: ([facility])___] : at the medical office located in  [Spouse] : spouse [Verbal consent obtained from patient] : the patient, [unfilled] [FreeTextEntry1] : Telephonic follow-up, Covid.  Wife present as well.\par \par Overall slow but steady improvements.  His sats are now 88% on room air.  94% with only 2 L/min with ambulation.  This is a significant improvement.\par \par 2 Days left of the 10-day dexamethasone course.\par \par He is on prophylactic Xarelto.\par \par Overall, feeling stronger, appetite better.\par \par Encouragement given, this is a slow process but he is making steady improvement.\par \par Follow-up planned for Friday

## 2021-01-15 ENCOUNTER — APPOINTMENT (OUTPATIENT)
Dept: PULMONOLOGY | Facility: CLINIC | Age: 71
End: 2021-01-15
Payer: MEDICARE

## 2021-01-15 PROCEDURE — 99214 OFFICE O/P EST MOD 30 MIN: CPT | Mod: 95

## 2021-01-15 NOTE — HISTORY OF PRESENT ILLNESS
[Home] : at home, [unfilled] , at the time of the visit. [Medical Office: ([facility])___] : at the medical office located in  [Spouse] : spouse [Verbal consent obtained from patient] : the patient, [unfilled] [FreeTextEntry1] : covid f/u, video\par \par Present as well.\par \par Overall, patient continues to slowly improve.  His oxygen saturation is now up to 97% on only 2 L/min.  On room air at rest he stays in the 90s.  Sometimes he goes for walks without his oxygen, desaturates to 88% on room air, much better than he had been.  Afebrile.  Feeling overall better on Xarelto prophylaxis\par \par On video exam, he appears great.  Using his 2 L/min.\par \par Last day of Decadron was today, completed a 10-day course.\par \par  COVID Pneumonia with hypoxemia, slow but steady improvement.\par \par Advised to wear oxygen, with exertion, and at sleep.  He can take it off for a while at rest, provided his oxygen saturation stays above 90.\par \par Continue Xarelto.  Decadron completed.\par \par Labs repeat on Monday\par \par

## 2021-01-16 ENCOUNTER — APPOINTMENT (OUTPATIENT)
Dept: PULMONOLOGY | Facility: CLINIC | Age: 71
End: 2021-01-16
Payer: MEDICARE

## 2021-01-16 DIAGNOSIS — Z99.81 DEPENDENCE ON SUPPLEMENTAL OXYGEN: ICD-10-CM

## 2021-01-16 PROCEDURE — 99215 OFFICE O/P EST HI 40 MIN: CPT | Mod: CS,95

## 2021-01-16 RX ORDER — DEXAMETHASONE 6 MG/1
6 TABLET ORAL DAILY
Qty: 10 | Refills: 0 | Status: DISCONTINUED | COMMUNITY
Start: 2021-01-05 | End: 2021-01-16

## 2021-01-16 RX ORDER — PREDNISONE 10 MG/1
10 TABLET ORAL DAILY
Qty: 14 | Refills: 0 | Status: ACTIVE | COMMUNITY
Start: 2021-01-16 | End: 1900-01-01

## 2021-01-16 NOTE — HISTORY OF PRESENT ILLNESS
[Home] : at home, [unfilled] , at the time of the visit. [Medical Office: ([facility])___] : at the medical office located in  [Spouse] : spouse [Verbal consent obtained from patient] : the patient, [unfilled] [FreeTextEntry1] : covid f/u, video\par \par Present as well.\par \par Overall, patient continues to slowly improve.  His oxygen saturation is now up to 97% on only 2 L/min.  On room air at rest he stays in the 90s.  Sometimes he goes for walks without his oxygen, desaturates to 88% on room air, much better than he had been.  Afebrile.  Feeling overall better on Xarelto prophylaxis\par \par On video exam, he appears great.  Using his 2 L/min.\par \par Last day of Decadron was today, completed a 10-day course.\par \par  COVID Pneumonia with hypoxemia, slow but steady improvement.\par \par Advised to wear oxygen, with exertion, and at sleep.  He can take it off for a while at rest, provided his oxygen saturation stays above 90.\par \par Continue Xarelto.  Decadron completed.\par \par Labs repeat on Monday\par \par

## 2021-01-16 NOTE — PLAN
[FreeTextEntry1] : assessment  plan----\par \par \par 70 male covid  ---[facility]\par feels better on oxygen on 2l/min\par completed  decadron--will start prednisone \par on xarelto\par awaiting blood work monday\par ct chest in 2 weeks

## 2021-01-18 ENCOUNTER — LABORATORY RESULT (OUTPATIENT)
Age: 71
End: 2021-01-18

## 2021-01-18 LAB
ALBUMIN SERPL ELPH-MCNC: 3.4 G/DL
ALP BLD-CCNC: 135 U/L
ALT SERPL-CCNC: 91 U/L
ANION GAP SERPL CALC-SCNC: 15 MMOL/L
AST SERPL-CCNC: 55 U/L
BILIRUB SERPL-MCNC: 0.3 MG/DL
BUN SERPL-MCNC: 31 MG/DL
CALCIUM SERPL-MCNC: 8.7 MG/DL
CHLORIDE SERPL-SCNC: 102 MMOL/L
CO2 SERPL-SCNC: 21 MMOL/L
CREAT SERPL-MCNC: 1.29 MG/DL
CRP SERPL-MCNC: 0.65 MG/DL
FERRITIN SERPL-MCNC: 723 NG/ML
GLUCOSE SERPL-MCNC: 100 MG/DL
POTASSIUM SERPL-SCNC: 4.8 MMOL/L
PROT SERPL-MCNC: 6.1 G/DL
SODIUM SERPL-SCNC: 137 MMOL/L

## 2021-01-19 ENCOUNTER — APPOINTMENT (OUTPATIENT)
Dept: PULMONOLOGY | Facility: CLINIC | Age: 71
End: 2021-01-19
Payer: MEDICARE

## 2021-01-19 LAB
BASOPHILS # BLD AUTO: 0.15 K/UL
BASOPHILS NFR BLD AUTO: 0.8 %
DEPRECATED D DIMER PPP IA-ACNC: 422 NG/ML DDU
EOSINOPHIL # BLD AUTO: 0.15 K/UL
EOSINOPHIL NFR BLD AUTO: 0.8 %
HCT VFR BLD CALC: 48.3 %
HGB BLD-MCNC: 15.2 G/DL
IMM GRANULOCYTES NFR BLD AUTO: 4.9 %
LYMPHOCYTES # BLD AUTO: 2.83 K/UL
LYMPHOCYTES NFR BLD AUTO: 14.7 %
MAN DIFF?: NORMAL
MCHC RBC-ENTMCNC: 28.4 PG
MCHC RBC-ENTMCNC: 31.5 GM/DL
MCV RBC AUTO: 90.1 FL
MONOCYTES # BLD AUTO: 1.91 K/UL
MONOCYTES NFR BLD AUTO: 9.9 %
NEUTROPHILS # BLD AUTO: 13.25 K/UL
NEUTROPHILS NFR BLD AUTO: 68.9 %
PLATELET # BLD AUTO: 323 K/UL
RBC # BLD: 5.36 M/UL
RBC # FLD: 16.4 %
WBC # FLD AUTO: 19.23 K/UL

## 2021-01-19 PROCEDURE — 99213 OFFICE O/P EST LOW 20 MIN: CPT | Mod: CS,95

## 2021-01-19 NOTE — HISTORY OF PRESENT ILLNESS
[Home] : at home, [unfilled] , at the time of the visit. [Medical Office: ([facility])___] : at the medical office located in  [Verbal consent obtained from patient] : the patient, [unfilled] [FreeTextEntry1] : Video telehealth follow-up, Covid\par \par Patient has been overall doing better.\par \par Wife had emailed me over the weekend, he is having some nasal congestion, epistaxis.  Otherwise doing well.\par \par I suggested nasal saline.  Consider over-the-counter decongestants.\par His D-dimer yesterday was down to 422, so advised he can discontinue the Xarelto.  He had already taken yesterday's dose however.\par \par Exam today, he appears well and in no distress.  Speaking clearly and comfortably.\par Oxygen saturation has been 95% on 2 L/min.  Off oxygen, 88% with significant exertion, but as high as 94% at rest off of oxygen.\par Also has a couple of cold sores now above his right lip.  Suggested Neosporin.\par \par On labs, his white count was 19.  No left shift.  Minimal elevation in transaminases and alk phos.  CRP, ferritin, D-dimer continue to trend down.\par \par Prednisone taper\par \par Overall, continues to clinically improve.  Decreasing FiO2 requirements.  Improving markers.\par DC Xarelto, as it was causing epistaxis, and his numbers look better.\par Continue to titrate down oxygen\par Neosporin to the sores on his face.  Nasal saline and decongestants for the nasal congestion\par Repeat labs on Thursday ordered, he will go to the labs that have been done\par

## 2021-01-22 ENCOUNTER — APPOINTMENT (OUTPATIENT)
Dept: PULMONOLOGY | Facility: CLINIC | Age: 71
End: 2021-01-22
Payer: MEDICARE

## 2021-01-22 LAB
ALBUMIN SERPL ELPH-MCNC: 3 G/DL
ALP BLD-CCNC: 135 U/L
ALT SERPL-CCNC: 52 U/L
ANION GAP SERPL CALC-SCNC: 12 MMOL/L
AST SERPL-CCNC: 37 U/L
BASOPHILS # BLD AUTO: 0.04 K/UL
BASOPHILS NFR BLD AUTO: 0.3 %
BILIRUB SERPL-MCNC: 0.4 MG/DL
BUN SERPL-MCNC: 25 MG/DL
CALCIUM SERPL-MCNC: 8.6 MG/DL
CHLORIDE SERPL-SCNC: 101 MMOL/L
CO2 SERPL-SCNC: 22 MMOL/L
CREAT SERPL-MCNC: 1.28 MG/DL
CRP SERPL-MCNC: 6.32 MG/DL
DEPRECATED D DIMER PPP IA-ACNC: 321 NG/ML DDU
EOSINOPHIL # BLD AUTO: 0.16 K/UL
EOSINOPHIL NFR BLD AUTO: 1.2 %
FERRITIN SERPL-MCNC: 662 NG/ML
GLUCOSE SERPL-MCNC: 94 MG/DL
HCT VFR BLD CALC: 43 %
HGB BLD-MCNC: 13.6 G/DL
IMM GRANULOCYTES NFR BLD AUTO: 1.6 %
LYMPHOCYTES # BLD AUTO: 1.45 K/UL
LYMPHOCYTES NFR BLD AUTO: 11 %
MAN DIFF?: NORMAL
MCHC RBC-ENTMCNC: 28 PG
MCHC RBC-ENTMCNC: 31.6 GM/DL
MCV RBC AUTO: 88.7 FL
MONOCYTES # BLD AUTO: 1.36 K/UL
MONOCYTES NFR BLD AUTO: 10.3 %
NEUTROPHILS # BLD AUTO: 9.99 K/UL
NEUTROPHILS NFR BLD AUTO: 75.6 %
PLATELET # BLD AUTO: 230 K/UL
POTASSIUM SERPL-SCNC: 4.3 MMOL/L
PROCALCITONIN SERPL-MCNC: 0.11 NG/ML
PROT SERPL-MCNC: 6 G/DL
RBC # BLD: 4.85 M/UL
RBC # FLD: 16 %
SODIUM SERPL-SCNC: 135 MMOL/L
WBC # FLD AUTO: 13.21 K/UL

## 2021-01-22 PROCEDURE — 99213 OFFICE O/P EST LOW 20 MIN: CPT | Mod: CS,95

## 2021-01-22 NOTE — HISTORY OF PRESENT ILLNESS
[Home] : at home, [unfilled] , at the time of the visit. [Medical Office: ([facility])___] : at the medical office located in  [Verbal consent obtained from patient] : the patient, [unfilled] [FreeTextEntry1] : Covid telehealth follow-up visit.  Wife present as well.\par \par Patient continues to improve.  He is still using the oxygen at night and intermittently.  His room air saturations have improved from 94 and you are up to 98% as well.  He is walking around the house, eating well.  Feels much better.\par \par Epistaxis has improved off Xarelto.\par \par His labs are all improved.\par \par He does still have his perioral ulcers, though they are not painful, burning, no itching.  The dermatologist prescribed some bacitracin, though he did not actually see them.\par \par On video exam, the patient appears well and in no distress.  Perioral ulcers predominantly on the right side of the face, though there does appear to be one small 1 above the left side of the lip as well.\par \par Recovering well from severe Covid infection.  Wean off oxygen.  Continue O2 with sleep for now ncrease activity level.  Follow-up next week.\par \par Referral made for a telehealth with dermatology as well.

## 2021-01-25 ENCOUNTER — APPOINTMENT (OUTPATIENT)
Dept: DERMATOLOGY | Facility: CLINIC | Age: 71
End: 2021-01-25
Payer: MEDICARE

## 2021-01-25 ENCOUNTER — NON-APPOINTMENT (OUTPATIENT)
Age: 71
End: 2021-01-25

## 2021-01-25 DIAGNOSIS — R21 RASH AND OTHER NONSPECIFIC SKIN ERUPTION: ICD-10-CM

## 2021-01-25 PROCEDURE — 99203 OFFICE O/P NEW LOW 30 MIN: CPT | Mod: 95

## 2021-01-25 RX ORDER — VALACYCLOVIR 1 G/1
1 TABLET, FILM COATED ORAL
Qty: 10 | Refills: 0 | Status: ACTIVE | COMMUNITY
Start: 2021-01-25 | End: 1900-01-01

## 2021-01-26 ENCOUNTER — APPOINTMENT (OUTPATIENT)
Dept: PULMONOLOGY | Facility: CLINIC | Age: 71
End: 2021-01-26
Payer: MEDICARE

## 2021-01-26 PROCEDURE — 99213 OFFICE O/P EST LOW 20 MIN: CPT | Mod: CS,95

## 2021-01-26 NOTE — HISTORY OF PRESENT ILLNESS
[Home] : at home, [unfilled] , at the time of the visit. [Medical Office: ([facility])___] : at the medical office located in  [Spouse] : spouse [Verbal consent obtained from patient] : the patient, [unfilled] [FreeTextEntry1] : Follow-up telehealth visit, Covid.  Wife present.\par \par Patient is doing so much better.  He is now essentially off oxygen during the day.  Oxygen saturations remain over 90% on room air.  Low 90s.  He is still using it to sleep.  He is moving much more, not getting out of breath.\par \par Derm appreciated . HSV, improving with valtrex\par \par on exam, he looks great\par off 02, comfortable\par oral lesions improving\par \par recovering from covid\par f/u next week\par \par

## 2021-01-27 NOTE — PHYSICAL EXAM
[FreeTextEntry3] : heme crusted erosions on rigfht upper cutaneous lip, left upper cutaneous lip and chin\par \par Physical exam limited, based off photos taken by patient and emailed to office.\par \par

## 2021-01-27 NOTE — HISTORY OF PRESENT ILLNESS
[FreeTextEntry1] : np, facial rash [de-identified] : 69 yo M recently hospitalized for severe COVID PNA, presenting for facial rash x 1 week. \par \par Rash initially developed as blisters on right side of face, subsequently spread, however no new lesions for last several days. Most lesions have crusted up. Assx.

## 2021-01-27 NOTE — ASSESSMENT
[FreeTextEntry1] : # rash on face, favor HSV>zoster given that rash crosses midline. No concern for ocular involvement. Although >72 hours have passed, will treat with antivirals given recent COVID infection and that fact that the patient has been on daily steroids for at least several weeks\par -I have discussed the nature and usual course with the patient. We have discussed treatment options, expectations from treatment, and associated side effects of therapies.\par - START Valtrex 1g BID x 5 days \par - infectious precautions reviewed\par \par RTC 2 weeks telehealth\par \par This was a telehealth encounter in which two-way real-time audio and video communication was utilized. Risks and benefits of using Telehealth services has been discussed with the patient. The patient has been given ample opportunity to discuss any questions regarding [facility]'s Telehealth services. All of the patient's questions were answered to his/her satisfaction.\par \par

## 2021-02-02 ENCOUNTER — APPOINTMENT (OUTPATIENT)
Dept: PULMONOLOGY | Facility: CLINIC | Age: 71
End: 2021-02-02

## 2021-02-02 ENCOUNTER — APPOINTMENT (OUTPATIENT)
Dept: PULMONOLOGY | Facility: CLINIC | Age: 71
End: 2021-02-02
Payer: MEDICARE

## 2021-02-02 PROCEDURE — 99441: CPT | Mod: CS,95

## 2021-02-02 NOTE — HISTORY OF PRESENT ILLNESS
[Home] : at home, [unfilled] , at the time of the visit. [Medical Office: ([facility])___] : at the medical office located in  [Spouse] : spouse [Verbal consent obtained from patient] : the patient, [unfilled] [FreeTextEntry1] : Telephonic f/u \par COVID. Wife present\par \par Continues to improve\par 02 sats remains mid 90s on RA at rest\par Still high 80s low 90s with exertion -- uses 02 for his exercises, and with sleep\par Still with nasal congestion and a lot of discharge\par No fevers.\par \par Sounds great on the phone\par \par He plans to schedule an appt with ENT near him\par Continue to monitor and wean of 02 as able\par schedule in -person f/u 4-6 weeks

## 2021-02-08 ENCOUNTER — APPOINTMENT (OUTPATIENT)
Dept: DERMATOLOGY | Facility: CLINIC | Age: 71
End: 2021-02-08
Payer: MEDICARE

## 2021-02-08 ENCOUNTER — APPOINTMENT (OUTPATIENT)
Dept: DERMATOLOGY | Facility: CLINIC | Age: 71
End: 2021-02-08

## 2021-02-08 PROCEDURE — 99213 OFFICE O/P EST LOW 20 MIN: CPT | Mod: 95

## 2021-03-03 ENCOUNTER — APPOINTMENT (OUTPATIENT)
Dept: PULMONOLOGY | Facility: CLINIC | Age: 71
End: 2021-03-03
Payer: MEDICARE

## 2021-03-03 VITALS
WEIGHT: 185 LBS | HEIGHT: 65 IN | TEMPERATURE: 98.1 F | OXYGEN SATURATION: 94 % | DIASTOLIC BLOOD PRESSURE: 94 MMHG | RESPIRATION RATE: 17 BRPM | SYSTOLIC BLOOD PRESSURE: 160 MMHG | HEART RATE: 89 BPM | BODY MASS INDEX: 30.82 KG/M2

## 2021-03-03 VITALS
BODY MASS INDEX: 30.82 KG/M2 | TEMPERATURE: 98.4 F | HEIGHT: 65 IN | OXYGEN SATURATION: 97 % | HEART RATE: 71 BPM | WEIGHT: 185 LBS

## 2021-03-03 PROCEDURE — 94010 BREATHING CAPACITY TEST: CPT

## 2021-03-03 PROCEDURE — 94729 DIFFUSING CAPACITY: CPT

## 2021-03-03 PROCEDURE — 94726 PLETHYSMOGRAPHY LUNG VOLUMES: CPT

## 2021-03-03 PROCEDURE — ZZZZZ: CPT

## 2021-03-03 PROCEDURE — 99213 OFFICE O/P EST LOW 20 MIN: CPT | Mod: CS,25

## 2021-03-03 NOTE — HISTORY OF PRESENT ILLNESS
[TextBox_4] : 70-year-old male, [facility] patient, here for in person follow-up.  Accompanied by his wife.\par \par Patient is doing great.  For the last 2 weeks he feels back to himself completely.\par Exercising on the treadmill.  He is still using his 1 L/min oxygen while on the treadmill, monitoring his sats which remained always in the mid 90s.  He is also still sleeping with 1 L/min of oxygen as a precaution.\par \par However, he feels back to baseline.  No dyspnea with exertion.  No other post Covid lingering symptoms.\par \par Perioral ulcers, which were felt to be viral, or healing as well.

## 2021-03-03 NOTE — DISCUSSION/SUMMARY
[FreeTextEntry1] : Previously healthy and active 70-year-old male, diagnosed with Covid December 28, treated and released from the emergency department, treated at home to the [facility] program with oxygen, dexamethasone, and prophylactic Xarelto.  Has made a wonderful recovery, is now feeling back to baseline.\par \par Pulmonary i function testing today shows normal spirometry.  His DLCO was mildly reduced at 58.  His lungs are clear.\par \par Advised him he can discontinue the nocturnal oxygen with sleep.\par As far as the treadmill, they will do a test run with his wife present and with oxygen on hand if necessary.  He will go on the treadmill with his pulse ox, as long as it stays greater than 92% he will not use the oxygen.  They will let me know how this goes\par \par Noncontrast CT ordered for follow-up\par \par We discussed vaccination, he is certainly eligible for the vaccine at this point, though is also likely safe through the 3-month period which ends at the end of March.  He does plan to sign up for the vaccine, they have a home in [place] which they plan to visit in a couple of weeks, and will probably get the vaccine over there

## 2021-03-04 ENCOUNTER — OUTPATIENT (OUTPATIENT)
Dept: OUTPATIENT SERVICES | Facility: HOSPITAL | Age: 71
LOS: 1 days | End: 2021-03-04
Payer: MEDICARE

## 2021-03-04 ENCOUNTER — APPOINTMENT (OUTPATIENT)
Dept: CT IMAGING | Facility: CLINIC | Age: 71
End: 2021-03-04
Payer: MEDICARE

## 2021-03-04 DIAGNOSIS — U07.1 COVID-19: ICD-10-CM

## 2021-03-04 DIAGNOSIS — Z98.49 CATARACT EXTRACTION STATUS, UNSPECIFIED EYE: Chronic | ICD-10-CM

## 2021-03-04 DIAGNOSIS — Z98.890 OTHER SPECIFIED POSTPROCEDURAL STATES: Chronic | ICD-10-CM

## 2021-03-04 PROCEDURE — 71250 CT THORAX DX C-: CPT | Mod: 26,ME

## 2021-03-04 PROCEDURE — 71250 CT THORAX DX C-: CPT

## 2021-03-04 PROCEDURE — G1004: CPT

## 2021-03-08 ENCOUNTER — NON-APPOINTMENT (OUTPATIENT)
Age: 71
End: 2021-03-08

## 2021-03-17 ENCOUNTER — APPOINTMENT (OUTPATIENT)
Dept: PULMONOLOGY | Facility: CLINIC | Age: 71
End: 2021-03-17

## 2022-01-25 NOTE — H&P PST ADULT - MUSCULOSKELETAL COMMENTS
Prior to immunization administration, verified patients identity using patient s name and date of birth. Please see Immunization Activity for additional information.     Screening Questionnaire for Adult Immunization    Are you sick today?   No   Do you have allergies to medications, food, a vaccine component or latex?   No   Have you ever had a serious reaction after receiving a vaccination?   No   Do you have a long-term health problem with heart, lung, kidney, or metabolic disease (e.g., diabetes), asthma, a blood disorder, no spleen, complement component deficiency, a cochlear implant, or a spinal fluid leak?  Are you on long-term aspirin therapy?   No   Do you have cancer, leukemia, HIV/AIDS, or any other immune system problem?   No   Do you have a parent, brother, or sister with an immune system problem?   No   In the past 3 months, have you taken medications that affect  your immune system, such as prednisone, other steroids, or anticancer drugs; drugs for the treatment of rheumatoid arthritis, Crohn s disease, or psoriasis; or have you had radiation treatments?   No   Have you had a seizure, or a brain or other nervous system problem?   No   During the past year, have you received a transfusion of blood or blood    products, or been given immune (gamma) globulin or antiviral drug?   No   For women: Are you pregnant or is there a chance you could become       pregnant during the next month?   No   Have you received any vaccinations in the past 4 weeks?   No     Immunization questionnaire answers were all negative.        Per orders of Dr. Vaughan, injection of Td given by Valentine Gonzales RN. Patient instructed to remain in clinic for 15 minutes afterwards, and to report any adverse reaction to me immediately.       Screening performed by Valentine Gonzales RN on 1/25/2022 at 12:03 PM.     right hand

## 2024-03-15 NOTE — ASU DISCHARGE PLAN (ADULT/PEDIATRIC). - B. DRINK ALCOHOL, BEER, OR WINE
Physical Therapy Daily Treatment Note               [address]                                                                                                                                             [place] [address]    Patient: Aydee Moore   : 1954  Referring practitioner: Jordan Reeder*  Date of Initial Visit: Type: THERAPY  Noted: 2024  Today's Date: 3/15/2024  Patient seen for 11 sessions       Visit Diagnoses:    ICD-10-CM ICD-9-CM   1. Chronic pain of both knees  M25.561 719.46    M25.562 338.29    G89.29    2. Primary osteoarthritis of both knees  M17.0 715.16   3. Difficulty walking  R26.2 719.7       Subjective Evaluation    History of Present Illness    Subjective comment: Pt reports that she had pain and spasms in (L) hip  yesterday on her hike.       Objective   See Exercise, Manual, and Modality Logs for complete treatment.   Added retro walking, IT band stretch    Assessment & Plan       Assessment  Assessment details: Pt completed treatment with no c/o pain increased pain in (B) knees.  Pt tolerated the addition of resisted retro walking for improved functional strength.  Substituted TKE vs thera band instead of TKE vs ball.  Pt had better kinesthetic sense with the band.  Continue to progress per POC          Timed:         Manual Therapy:    0     mins  80305;     Therapeutic Exercise:    10     mins  78720;     Neuromuscular Enedelia:    0    mins  74552;    Therapeutic Activity:     33     mins  51339;     Gait Trainin     mins  76451;     Ultrasound:     0     mins  81529;    E Stim                            0    mins   27086( [id number])  Work Cheek/Cond      0    mins   47189        Timed Treatment:   43  mins   Total Treatment:     56   mins    Jared Neely PTA  [place] License: [id number]  
Statement Selected

## 2024-06-26 ENCOUNTER — NON-APPOINTMENT (OUTPATIENT)
Age: 74
End: 2024-06-26

## 2024-09-09 ENCOUNTER — NON-APPOINTMENT (OUTPATIENT)
Age: 74
End: 2024-09-09

## 2025-04-13 NOTE — ASU PATIENT PROFILE, ADULT - BLOOD TRANSFUSION, PREVIOUS, PROFILE
Problem: Chronic Conditions and Co-morbidities  Goal: Patient's chronic conditions and co-morbidity symptoms are monitored and maintained or improved  Outcome: Progressing     Problem: Pain  Goal: Verbalizes/displays adequate comfort level or baseline comfort level  4/13/2025 1608 by Jossie Chamorro RN  Outcome: Progressing  4/13/2025 0331 by Alysha Rudolph RN  Outcome: Progressing     Problem: Safety - Adult  Goal: Free from fall injury  4/13/2025 1608 by Jossie Chamorro RN  Outcome: Progressing  4/13/2025 0331 by Alysha Rudolph RN  Outcome: Progressing  Flowsheets (Taken 4/12/2025 2000)  Free From Fall Injury: Based on caregiver fall risk screen, instruct family/caregiver to ask for assistance with transferring infant if caregiver noted to have fall risk factors     Problem: Discharge Planning  Goal: Discharge to home or other facility with appropriate resources  Outcome: Progressing     Problem: Safety - Medical Restraint  Goal: Remains free of injury from restraints (Restraint for Interference with Medical Device)  Description: INTERVENTIONS:1. Determine that other, less restrictive measures have been tried or would not be effective before applying the restraint2. Evaluate the patient's condition at the time of restraint application3. Inform patient/family regarding the reason for restraint4. Q2H: Monitor safety, psychosocial status, comfort, nutrition and hydration  4/13/2025 1608 by Jossie Chamorro RN  Outcome: Progressing  4/13/2025 0331 by Alysha Rudolph RN  Outcome: Progressing  Flowsheets  Taken 4/12/2025 2000 by Alysha Rudolph RN  Remains free of injury from restraints (restraint for interference with medical device): Every 2 hours: Monitor safety, psychosocial status, comfort, nutrition and hydration  Taken 4/12/2025 1859 by Julissa Naylor RN  Remains free of injury from restraints (restraint for interference with medical device):   Determine  marky and determine need for appliance change or resting period  4/13/2025 1608 by Jossie Chamorro RN  Outcome: Progressing  4/13/2025 0331 by Alysha Rudolph, RN  Outcome: Progressing  Goal: Incisions, wounds, or drain sites healing without S/S of infection  Outcome: Progressing  Goal: Oral mucous membranes remain intact  Outcome: Progressing     Problem: Gastrointestinal - Adult  Goal: Minimal or absence of nausea and vomiting  Outcome: Progressing  Goal: Maintains or returns to baseline bowel function  Outcome: Progressing  Goal: Maintains adequate nutritional intake  Outcome: Progressing  Goal: Establish and maintain optimal ostomy function  Outcome: Progressing     Problem: Genitourinary - Adult  Goal: Absence of urinary retention  Outcome: Progressing  Goal: Urinary catheter remains patent  Outcome: Progressing     Problem: Metabolic/Fluid and Electrolytes - Adult  Goal: Electrolytes maintained within normal limits  Outcome: Progressing  Goal: Hemodynamic stability and optimal renal function maintained  Outcome: Progressing  Goal: Glucose maintained within prescribed range  Outcome: Progressing     Problem: Hematologic - Adult  Goal: Maintains hematologic stability  Outcome: Progressing     Problem: Respiratory - Adult  Goal: Achieves optimal ventilation and oxygenation  4/13/2025 1608 by Jossie Chamorro RN  Outcome: Progressing  4/13/2025 0833 by Yefri Arthur, RT  Outcome: Not Progressing      no

## 2025-04-14 NOTE — H&P PST ADULT - NSALCOHOLAMT_GEN_A_CORE_SD
Additional Notes: 40 units used\\nFrontalis\\nCorrugators/procerus\\nObicularis oculi\\nMentalis Render Risk Assessment In Note?: no Detail Level: Simple 1-2 drinks